# Patient Record
Sex: FEMALE | Race: WHITE
[De-identification: names, ages, dates, MRNs, and addresses within clinical notes are randomized per-mention and may not be internally consistent; named-entity substitution may affect disease eponyms.]

---

## 2017-02-15 ENCOUNTER — HOSPITAL ENCOUNTER (OUTPATIENT)
Dept: HOSPITAL 45 - C.RAD | Age: 52
Discharge: HOME | End: 2017-02-15
Attending: FAMILY MEDICINE
Payer: COMMERCIAL

## 2017-02-15 DIAGNOSIS — H69.80: Primary | ICD-10-CM

## 2017-02-15 DIAGNOSIS — F41.8: ICD-10-CM

## 2017-02-15 DIAGNOSIS — R13.10: ICD-10-CM

## 2017-02-15 DIAGNOSIS — K44.9: ICD-10-CM

## 2017-02-15 NOTE — DIAGNOSTIC IMAGING REPORT
DOUBLE CONTRAST BARIUM ESOPHAGRAM



CLINICAL HISTORY:  Swallowing difficulty.



COMPARISON STUDY:  Abdominal CT dated 1/27/2015.



TECHNIQUE: A standard air contrast barium esophagram is performed. Multiple spot

images of the esophagus are acquired both upright and prone.



FINDINGS: The patient swallowed barium without difficulty. The barium pill

became lodged in the distal esophagus before passing into the stomach. Mild

dysmotility is observed. The distal esophagus is slightly patulous. The mucosal

pattern is normal. There is no evidence of intrinsic or extrinsic mass lesion.

No aspiration was seen.  The gastroesophageal junction distended normally. No

gastroesophageal reflux could be elicited by having the patient perform the

Valsalva maneuver. A small hiatal hernia is identified.



Fluoroscopy time:  1.5 minutes.



Fluoroscopic images: 27





IMPRESSION:



1. Mild esophageal dysmotility. The barium pill became lodged in the distal

esophagus before passing in the stomach.



2. Small hiatal hernia.







Electronically signed by:  Elmer Moulton M.D.

2/15/2017 8:31 AM



Dictated Date/Time:  2/15/2017 8:28 AM

## 2017-04-24 ENCOUNTER — HOSPITAL ENCOUNTER (OUTPATIENT)
Dept: HOSPITAL 45 - C.RADBC | Age: 52
Discharge: HOME | End: 2017-04-24
Attending: ANESTHESIOLOGY
Payer: COMMERCIAL

## 2017-04-24 DIAGNOSIS — Z98.890: Primary | ICD-10-CM

## 2017-04-24 NOTE — DIAGNOSTIC IMAGING REPORT
CHEST 2 VIEWS ROUTINE



CLINICAL HISTORY: Status post intercostal nerve block    



COMPARISON STUDY:  No previous studies for comparison.



FINDINGS: The cardiac and mediastinal contours are normal. There is no evidence

of focal pulmonary consolidation. There is no evidence of failure. No pleural

effusions are visualized.[ There is no pneumothorax. Increased markings the

right medial lung base are felt to be secondary to either atelectasis or a fat

pad.



IMPRESSION: No active disease in the chest. No evidence of pneumothorax status

post intercostal nerve block.







Electronically signed by:  Chinedu Morris M.D.

4/24/2017 3:42 PM



Dictated Date/Time:  4/24/2017 3:41 PM

## 2017-06-08 ENCOUNTER — HOSPITAL ENCOUNTER (OUTPATIENT)
Dept: HOSPITAL 45 - C.RADBC | Age: 52
Discharge: HOME | End: 2017-06-08
Attending: ANESTHESIOLOGY
Payer: COMMERCIAL

## 2017-06-08 DIAGNOSIS — Z98.890: Primary | ICD-10-CM

## 2017-06-08 NOTE — DIAGNOSTIC IMAGING REPORT
TWO VIEW CHEST



CLINICAL HISTORY: Status post right intercostal injection.



FINDINGS: PA and lateral chest radiographs are compared to study dated

4/24/2017. The examination is degraded by large body habitus. The

cardiomediastinal silhouette is unremarkable.  There is mild bibasilar

atelectasis. The lungs and pleural spaces are otherwise clear. There is no

pneumothorax. The skeletal structures are osteopenic. The bony thorax appears

intact. Cholecystectomy clips are identified in the right upper quadrant.



IMPRESSION:



1. No pneumothorax is seen post procedure.



2. There is no airspace consolidation or pleural effusion.







Electronically signed by:  Elmer Moulton M.D.

6/8/2017 1:55 PM



Dictated Date/Time:  6/8/2017 1:54 PM

## 2017-08-10 ENCOUNTER — HOSPITAL ENCOUNTER (OUTPATIENT)
Dept: HOSPITAL 45 - C.RADBC | Age: 52
Discharge: HOME | End: 2017-08-10
Attending: ANESTHESIOLOGY
Payer: COMMERCIAL

## 2017-08-10 DIAGNOSIS — Z98.890: Primary | ICD-10-CM

## 2017-08-10 NOTE — DIAGNOSTIC IMAGING REPORT
CHEST 2 VIEWS ROUTINE



CLINICAL HISTORY: Status post intercostal nerve block. Evaluate for

pneumothorax.    



COMPARISON STUDY:  Chest radiograph June 8, 2017.



FINDINGS: Lung volumes are normal. There is no pneumothorax or pleural effusion.

A calcified left mid to lower lung nodule is incidentally noted.

Cardiomediastinal silhouette is normal. There is no consolidation. Pulmonary

vascularity is normal. The appearance of the chest is unchanged. 



IMPRESSION:  No acute cardiopulmonary findings. No pneumothorax. 









Electronically signed by:  Kevin Valle M.D.

8/10/2017 10:09 AM



Dictated Date/Time:  8/10/2017 10:06 AM

## 2017-09-15 ENCOUNTER — HOSPITAL ENCOUNTER (OUTPATIENT)
Dept: HOSPITAL 45 - C.CTS | Age: 52
Discharge: HOME | End: 2017-09-15
Attending: NURSE PRACTITIONER
Payer: COMMERCIAL

## 2017-09-15 DIAGNOSIS — R51: Primary | ICD-10-CM

## 2017-09-15 NOTE — DIAGNOSTIC IMAGING REPORT
HEAD COMBO



CLINICAL HISTORY: Severe headache. Blurred vision.    



COMPARISON STUDY:  MRI the brain May 21, 2013.



TECHNIQUE: Axial images of the head were obtained before and after intravenous

administration of 94 cc Optiray 320 IV.



FINDINGS: No acute intracranial hemorrhage, midline shift or mass effect is

present. Ventricular system is normal. Basilar cisterns are patent. There are no

extra-axial collections. Gray-white differentiation is maintained. There are no

findings to suggest acute dural sinus thrombosis or acute territorial infarct.

There is no intracranial mass or pathologic enhancement. There are no

significant calvarial abnormalities. Visualized portions of the sinuses and

mastoid air cells are clear.



IMPRESSION:  



1. No acute intracranial findings.



2. No intracranial mass or pathologic enhancement. 









Electronically signed by:  Kevin Valle M.D.

9/15/2017 11:20 AM



Dictated Date/Time:  9/15/2017 11:15 AM

## 2017-09-19 ENCOUNTER — HOSPITAL ENCOUNTER (EMERGENCY)
Dept: HOSPITAL 45 - C.EDB | Age: 52
Discharge: HOME | End: 2017-09-19
Payer: COMMERCIAL

## 2017-09-19 VITALS — DIASTOLIC BLOOD PRESSURE: 49 MMHG | HEART RATE: 82 BPM | OXYGEN SATURATION: 97 % | SYSTOLIC BLOOD PRESSURE: 101 MMHG

## 2017-09-19 VITALS — TEMPERATURE: 98.06 F

## 2017-09-19 VITALS
WEIGHT: 218.92 LBS | BODY MASS INDEX: 36.47 KG/M2 | HEIGHT: 65 IN | HEIGHT: 65 IN | WEIGHT: 218.92 LBS | BODY MASS INDEX: 36.47 KG/M2

## 2017-09-19 DIAGNOSIS — K21.9: ICD-10-CM

## 2017-09-19 DIAGNOSIS — R10.9: Primary | ICD-10-CM

## 2017-09-19 DIAGNOSIS — K43.9: ICD-10-CM

## 2017-09-19 DIAGNOSIS — F17.200: ICD-10-CM

## 2017-09-19 LAB
ALBUMIN/GLOB SERPL: 0.9 {RATIO} (ref 0.9–2)
ALP SERPL-CCNC: 116 U/L (ref 45–117)
ALT SERPL-CCNC: 18 U/L (ref 12–78)
ANION GAP SERPL CALC-SCNC: 6 MMOL/L (ref 3–11)
APPEARANCE UR: CLEAR
AST SERPL-CCNC: 13 U/L (ref 15–37)
BASOPHILS # BLD: 0.04 K/UL (ref 0–0.2)
BASOPHILS NFR BLD: 0.4 %
BILIRUB UR-MCNC: (no result) MG/DL
BUN SERPL-MCNC: 11 MG/DL (ref 7–18)
BUN/CREAT SERPL: 13.3 (ref 10–20)
CALCIUM SERPL-MCNC: 9.1 MG/DL (ref 8.5–10.1)
CHLORIDE SERPL-SCNC: 106 MMOL/L (ref 98–107)
CO2 SERPL-SCNC: 27 MMOL/L (ref 21–32)
COLOR UR: YELLOW
COMPLETE: YES
CREAT CL PREDICTED SERPL C-G-VRATE: 93.7 ML/MIN
CREAT SERPL-MCNC: 0.82 MG/DL (ref 0.6–1.2)
EOSINOPHIL NFR BLD AUTO: 265 K/UL (ref 130–400)
GLOBULIN SER-MCNC: 3.5 GM/DL (ref 2.5–4)
GLUCOSE SERPL-MCNC: 89 MG/DL (ref 70–99)
HCT VFR BLD CALC: 48.9 % (ref 37–47)
IG%: 0.6 %
IMM GRANULOCYTES NFR BLD AUTO: 27.9 %
INR PPP: 0.9 (ref 0.9–1.1)
LYMPHOCYTES # BLD: 2.76 K/UL (ref 1.2–3.4)
MANUAL MICROSCOPIC REQUIRED?: NO
MCH RBC QN AUTO: 31 PG (ref 25–34)
MCHC RBC AUTO-ENTMCNC: 33.5 G/DL (ref 32–36)
MCV RBC AUTO: 92.4 FL (ref 80–100)
MONOCYTES NFR BLD: 8.1 %
NEUTROPHILS # BLD AUTO: 1 %
NEUTROPHILS NFR BLD AUTO: 62 %
NITRITE UR QL STRIP: (no result)
PH UR STRIP: 6.5 [PH] (ref 4.5–7.5)
PMV BLD AUTO: 10.4 FL (ref 7.4–10.4)
POTASSIUM SERPL-SCNC: 4 MMOL/L (ref 3.5–5.1)
PROTHROMBIN TIME: 9.6 SECONDS (ref 9–12)
RBC # BLD AUTO: 5.29 M/UL (ref 4.2–5.4)
REVIEW REQ?: NO
SODIUM SERPL-SCNC: 139 MMOL/L (ref 136–145)
SP GR UR STRIP: 1.02 (ref 1–1.03)
URINE BILL WITH OR WITHOUT MIC: (no result)
URINE EPITHELIAL CELL AUTO: (no result) /LPF (ref 0–5)
UROBILINOGEN UR-MCNC: (no result) MG/DL
WBC # BLD AUTO: 9.88 K/UL (ref 4.8–10.8)
ZZUR CULT IF INDIC CLEAN CATCH: NO

## 2017-09-19 NOTE — DIAGNOSTIC IMAGING REPORT
ABD/PELVIS IV AND ORAL CONT



CT DOSE: 1335.74 mGy.cm



HISTORY: Pain  periumbilical pain, hernia



TECHNIQUE: Multiaxial CT images of the abdomen and pelvis were performed

following the use of intravenous and oral contrast.  A dose lowering technique

was utilized adhering to the principles of ALARA.





COMPARISON STUDY: 1/27/2015



FINDINGS: Lung bases are clear. Mild fatty infiltration of liver.

Cholecystectomy. Pancreas is uniform. Kidneys enhance uniformly. No evidence for

hydronephrosis.



Bowel pattern overall is nonobstructive. There is a fat-containing periumbilical

hernia. This is increased from 1.6 to 2.9 cm in maximal cross-sectional

dimension. There is no evidence of bowel containment or obstruction. Bladder is

midline. There is no significant free fluid within the cul-de-sac.



IMPRESSION: 



1. Fat-containing periumbilical hernia increased from 1.62 to 3cm in maximum

transaxial dimension.

2. No evidence of bowel containment or obstruction.





3. Mild fatty infiltration of liver.





4. Otherwise negative study post cholecystectomy 









The above report was generated using voice recognition software.  It may contain

grammatical, syntax or spelling errors.







Electronically signed by:  Gerry Mondragon M.D.

9/19/2017 7:05 PM



Dictated Date/Time:  9/19/2017 6:59 PM

## 2017-09-19 NOTE — EMERGENCY ROOM VISIT NOTE
History


Report prepared by Mona:  Alan Mosher


Under the Supervision of:  Dr. Klarissa Laughlin D.O.


First contact with patient:  16:02


Chief Complaint:  ABDOMINAL PAIN


Stated Complaint:  INJFECTED? HERNIA





History of Present Illness


The patient is a 52 year old female who presents to the Emergency Room with 

complaints of a constant, burning sensation to the periumbilical region of her 

abdomen beginning three days ago. The patient states that she has history of 

three hernias at her umbilicus and an infected gallstone. She reports that she 

had one surgery to remove them all. The patient notes that the gallstone and 

hernias occurred one year after her cholecystectomy. She states that she 

developed another hernia, in the same location, one month ago. The patient 

reports that three days ago she sneezed and immediately experienced a burning 

in the location of her hernia. She notes that movement worsens her symptoms, 

but it is tolerable when she is resting. The patient states that she called her 

PCP and was told to go the ED. She reports that the lump is getting bigger and 

it is currently warm. The patient notes that she has also been experiencing 

more problems with her GERD, nausea, and a subjected low grade fever. She 

denies chills. The patient states that she is currently on Augmentin for a 

sinus infection. She reports that she has not had a bowel movement in three 

days. The patient notes that a few days ago, she drank on cup of coffee and a 

can of mountain dew. She states that she went to the restroom eight times in 

four hours. The patient reports that this typically happens with water, nothing 

else. She notes that she has not taken medication for her pain because she does 

not like swallowing pills. The patient notes that she has a history of severe 

trauma from rolling down 14 stairs at work. She states that she is still 

recovering.





   Source of History:  patient


   Onset:  three days ago


   Position:  abdomen (periumbilical)


   Quality:  burning


   Timing:  constant


   Modifying Factors (Worsening):  movement


   Modifying Factors (Relieving):  rest


   Associated Symptoms:  + fevers, + nausea, No chills


Note:


Associated symptoms: constipation, GERD, increased urinary frequency





Review of Systems


See HPI for pertinent positives & negatives. A total of 10 systems reviewed and 

were otherwise negative.





Past Medical & Surgical


Medical Problems:


(1) GERD (gastroesophageal reflux disease)


(2) Hernia








Family History





Patient reports no known family medical history.





Social History


Smoking Status:  Current Every Day Smoker


Marital Status:  


Occupation Status:  unemployed





Current/Historical Medications


Scheduled


Amoxicillin & Pot Clavulanate (Augmentin 875-125 mg), 1 TAB PO BID


Citalopram Hydrobromide (Celexa), 1 TAB PO DAILY


Lamotrigine (Lamictal), 100 MG PO DAILY


Meloxicam (Mobic), 15 MG PO DAILY


Mometasone Furoate-Formoterol (Dulera 100/5 Mcg), 2 PUFFS INH BID


Omeprazole (Prilosec), 40 MG PO DAILY





Scheduled PRN


Albuterol Hfa (Ventolin Hfa), 2-4 PUFFS INH Q6H PRN for Shortness of Breath


Lorazepam (Ativan), 1.5 MG PO DAILY PRN for Anxiety


Tramadol (Ultram), 50 MG PO Q4H PRN for Pain





Allergies


Coded Allergies:  


     BEE STING (Unverified  Allergy, Severe, VOMITING/SWELLING, 9/19/17)


     Bacitracin (Verified  Allergy, Unknown, SEVERE RASH, 9/19/17)


     Bell Pepper (Unverified  Allergy, Unknown, GI ISSUES, 9/19/17)


     Hydrocodone (Verified  Allergy, Unknown, INSOMNIA, RESTLESSNESS, 9/19/17)


     Neomycin (Verified  Allergy, Unknown, SEVERE RASH, 9/19/17)


     Polymyxin B (Verified  Allergy, Unknown, SEVERE RASH, 9/19/17)


     Acetaminophen (Verified  Adverse Reaction, Unknown, VOMITING, 9/19/17)


     Doxycycline (Verified  Adverse Reaction, Unknown, VOMITING, 9/19/17)


     Oxycodone (Verified  Adverse Reaction, Unknown, VOMITING, 9/19/17)





Physical Exam


Vital Signs











  Date Time  Temp Pulse Resp B/P (MAP) Pulse Ox O2 Delivery O2 Flow Rate FiO2


 


9/19/17 20:46  82 16 101/49 97   


 


9/19/17 19:52  85 16 103/75 96 Room Air  


 


9/19/17 17:43  86 16 107/80 94 Room Air  


 


9/19/17 15:59 36.7 111 16 143/82 93 Room Air  











Physical Exam


GENERAL: alert, well appearing, well nourished, no distress, non-toxic, very 

obese


EYE EXAM: normal conjunctiva, PERRL and EOM's grossly intact


OROPHARYNX: no exudate, no erythema, lips, buccal mucosa, and tongue normal and 

mucous membranes are dry


NECK: supple, no nuchal rigidity, no adenopathy, non-tender


LUNGS: Clear to auscultation. Normal chest wall mechanics


HEART: no murmurs, S1 normal and S2 normal 


ABDOMEN: abdomen soft, normo-active bowel sounds, no masses, no rebound or 

guarding. Superior to the umbilicus - area of firmness with tenderness upon 

palpation. Nearly well-healed surgical scar, no obvious mass. Rest of abdomen 

was nontender, no overlying erythema.


BACK: Back is symmetrical on inspection and there is no deformity, no midline 

tenderness, no CVA tenderness. 


SKIN: no rashes and no bruising 


UPPER EXTREMITIES: upper extremities are grossly normal. 


LOWER EXTREMITIES: No pitting edema.


NEURO EXAM: Normal sensorium, cranial nerves II-XII grossly intact, normal 

speech,  no gross weakness of arms, no gross weakness of legs.





Medical Decision & Procedures


ER Provider


Diagnostic Interpretation:


CT:Per my review, radiologist interpretation.





ABD/PELVIS IV AND ORAL CONT





CT DOSE: 1335.74 mGy.cm





HISTORY: Pain  periumbilical pain, hernia





TECHNIQUE: Multiaxial CT images of the abdomen and pelvis were performed


following the use of intravenous and oral contrast.  A dose lowering technique


was utilized adhering to the principles of ALARA.








COMPARISON STUDY: 1/27/2015





FINDINGS: Lung bases are clear. Mild fatty infiltration of liver.


Cholecystectomy. Pancreas is uniform. Kidneys enhance uniformly. No evidence for


hydronephrosis.





Bowel pattern overall is nonobstructive. There is a fat-containing periumbilical


hernia. This is increased from 1.6 to 2.9 cm in maximal cross-sectional


dimension. There is no evidence of bowel containment or obstruction. Bladder is


midline. There is no significant free fluid within the cul-de-sac.





IMPRESSION: 


1. Fat-containing periumbilical hernia increased from 1.62 to 3cm in maximum


transaxial dimension.


2. No evidence of bowel containment or obstruction.


3. Mild fatty infiltration of liver.


4. Otherwise negative study post cholecystectomy 





The above report was generated using voice recognition software.  It may contain


grammatical, syntax or spelling errors.





Electronically signed by:  Gerry Mondragon M.D.


9/19/2017 7:05 PM





Dictated Date/Time:  9/19/2017 6:59 PM





Laboratory Results


9/19/17 16:30








Red Blood Count 5.29, Mean Corpuscular Volume 92.4, Mean Corpuscular Hemoglobin 

31.0, Mean Corpuscular Hemoglobin Concent 33.5, Mean Platelet Volume 10.4, 

Neutrophils (%) (Auto) 62.0, Lymphocytes (%) (Auto) 27.9, Monocytes (%) (Auto) 

8.1, Eosinophils (%) (Auto) 1.0, Basophils (%) (Auto) 0.4, Neutrophils # (Auto) 

6.12, Lymphocytes # (Auto) 2.76, Monocytes # (Auto) 0.80, Eosinophils # (Auto) 

0.10, Basophils # (Auto) 0.04





9/19/17 16:30

















Test


  9/19/17


16:30


 


White Blood Count


  9.88 K/uL


(4.8-10.8)


 


Red Blood Count


  5.29 M/uL


(4.2-5.4)


 


Hemoglobin


  16.4 g/dL


(12.0-16.0)


 


Hematocrit 48.9 % (37-47) 


 


Mean Corpuscular Volume


  92.4 fL


()


 


Mean Corpuscular Hemoglobin


  31.0 pg


(25-34)


 


Mean Corpuscular Hemoglobin


Concent 33.5 g/dl


(32-36)


 


Platelet Count


  265 K/uL


(130-400)


 


Mean Platelet Volume


  10.4 fL


(7.4-10.4)


 


Neutrophils (%) (Auto) 62.0 % 


 


Lymphocytes (%) (Auto) 27.9 % 


 


Monocytes (%) (Auto) 8.1 % 


 


Eosinophils (%) (Auto) 1.0 % 


 


Basophils (%) (Auto) 0.4 % 


 


Neutrophils # (Auto)


  6.12 K/uL


(1.4-6.5)


 


Lymphocytes # (Auto)


  2.76 K/uL


(1.2-3.4)


 


Monocytes # (Auto)


  0.80 K/uL


(0.11-0.59)


 


Eosinophils # (Auto)


  0.10 K/uL


(0-0.5)


 


Basophils # (Auto)


  0.04 K/uL


(0-0.2)


 


RDW Standard Deviation


  45.4 fL


(36.4-46.3)


 


RDW Coefficient of Variation


  13.5 %


(11.5-14.5)


 


Immature Granulocyte % (Auto) 0.6 % 


 


Immature Granulocyte # (Auto)


  0.06 K/uL


(0.00-0.02)


 


Prothrombin Time


  9.6 SECONDS


(9.0-12.0)


 


Prothromb Time International


Ratio 0.9 (0.9-1.1) 


 


 


Urine Color YELLOW 


 


Urine Appearance CLEAR (CLEAR) 


 


Urine pH 6.5 (4.5-7.5) 


 


Urine Specific Gravity


  1.023


(1.000-1.030)


 


Urine Protein NEG (NEG) 


 


Urine Glucose (UA) NEG (NEG) 


 


Urine Ketones TRACE (NEG) 


 


Urine Occult Blood NEG (NEG) 


 


Urine Nitrite NEG (NEG) 


 


Urine Bilirubin NEG (NEG) 


 


Urine Urobilinogen NEG (NEG) 


 


Urine Leukocyte Esterase TRACE (NEG) 


 


Urine WBC (Auto) 1-5 /hpf (0-5) 


 


Urine RBC (Auto) 0-4 /hpf (0-4) 


 


Urine Hyaline Casts (Auto) 1-5 /lpf (0-5) 


 


Urine Epithelial Cells (Auto)


  20-30 /lpf


(0-5)


 


Urine Bacteria (Auto) NEG (NEG) 


 


Anion Gap


  6.0 mmol/L


(3-11)


 


Est Creatinine Clear Calc


Drug Dose 93.7 ml/min 


 


 


Estimated GFR (


American) 95.4 


 


 


Estimated GFR (Non-


American 82.3 


 


 


BUN/Creatinine Ratio 13.3 (10-20) 


 


Lactic Acid Level


  1.2 mmol/L


(0.4-2.0)


 


Calcium Level


  9.1 mg/dl


(8.5-10.1)


 


Total Bilirubin


  0.2 mg/dl


(0.2-1)


 


Aspartate Amino Transf


(AST/SGOT) 13 U/L (15-37) 


 


 


Alanine Aminotransferase


(ALT/SGPT) 18 U/L (12-78) 


 


 


Alkaline Phosphatase


  116 U/L


()


 


Troponin I


  < 0.015 ng/ml


(0-0.045)


 


Total Protein


  6.7 gm/dl


(6.4-8.2)


 


Albumin


  3.2 gm/dl


(3.4-5.0)


 


Globulin


  3.5 gm/dl


(2.5-4.0)


 


Albumin/Globulin Ratio 0.9 (0.9-2) 





Laboratory results per my review.





Medications Administered











 Medications


  (Trade)  Dose


 Ordered  Sig/Michi


 Route  Start Time


 Stop Time Status Last Admin


Dose Admin


 


 Sodium Chloride  1,000 ml @ 


 250 mls/hr  Q4H STAT


 IV  9/19/17 16:19


 9/19/17 20:18 DC 9/19/17 16:38


250 MLS/HR


 


 Morphine Sulfate


  (MoRPHine


 SULFATE INJ)  4 mg  NOW  STAT


 IV  9/19/17 16:19


 9/19/17 16:22 DC 9/19/17 16:38


4 MG


 


 Ondansetron HCl


  (Zofran 8mg Iv)  8 mg  NOW  STAT


 IV  9/19/17 16:19


 9/19/17 16:22 DC 9/19/17 16:40


8 MG


 


 Tramadol HCl


  (Ultram Tab)  50 mg  NOW  STAT


 PO  9/19/17 19:48


 9/19/17 19:49 DC 9/19/17 19:52


50 MG











ECG


Indication:  abdominal pain


Rate (beats per minute):  98


Rhythm:  normal sinus


Findings:  no acute ischemic change, no ectopy, other (Normal axis and interval)





ED Course


1604: The patient was evaluated in room C02B. A complete history and physical 

exam was performed. 





1619: Ordered Ondansetron HCl 8mg IV, Morphine Sulfate 4mg IV, Sodium Chloride 

1000 ml @ 250 mls/hr IV





1943: I reevaluated the patient, she is resting. I updated her of her current 

exam findings and test results. She is still experiencing some discomfort. We 

are going to try Tramadol.





1948: Ordered Tramadol HCl 50mg PO





2034: Upon reevaluation, the patient is feeling better. I discussed the 

findings and the treatment plan with the patient.  She verbalizes agreement and 

understanding.  The patient was discharged home.





Medical Decision


Differential diagnoses includes but is not limited to gastritis, peptic ulcer 

disease, GERD, gallbladder disease, pancreatitis, small bowel obstruction, 

acute coronary syndrome, pericarditis, ischemic bowel, irritable bowel disease, 

irritable bowel syndrome, appendicitis, diverticulitis, malignancy, hernia, 

urinary tract infection, torsion, pregnancy/ectopic pregnancy, perforation, 

trauma, infectious. 





Pt with prior hx of hernia and repairs, fat containing hernia on CT noted, labs 

otw reassuring.  Discussed with pt all results, minimizing activity which could 

exacerbate pain.  Discussed f/u with surgeon, discussed sx to watch/return for, 

use of meds, she verbalized understanding and was agreeable with plan.  VS otw 

stable.  Doubt occult vascular or infectious pathology.





Impression





 Primary Impression:  


 Abdominal pain


 Additional Impression:  


 Hernia of abdominal wall





Scribe Attestation


The scribe's documentation has been prepared under my direction and personally 

reviewed by me in its entirety. I confirm that the note above accurately 

reflects all work, treatment, procedures, and medical decision making performed 

by me.





Departure Information


Dispostion


Home / Self-Care





Prescriptions





Tramadol (Ultram) 50 Mg Tab


50 MG PO Q4H Y for Pain, #10 TAB


   Prov: Klarissa Laughlin, DO         9/19/17





Referrals


Drew Moreau M.D. (PCP)





Forms


Call Back Authorization, HOME CARE DOCUMENTATION FORM,                         

                                       IMPORTANT VISIT INFORMATION





Patient Instructions


My [facility]





Additional Instructions





Please follow up with your regular doctor.  You may use the pain medications as 

prescribed.  If you have any worsening pain, develop fevers, vomiting, notice 

black or bloody stools, or you have any other new concerns, please return the 

emergency room.





Problem Qualifiers








 Primary Impression:  


 Abdominal pain


 Abdominal location:  unspecified location  Qualified Codes:  R10.9 - 

Unspecified abdominal pain

## 2018-01-03 ENCOUNTER — HOSPITAL ENCOUNTER (OUTPATIENT)
Dept: HOSPITAL 45 - C.RDSM | Age: 53
Discharge: HOME | End: 2018-01-03
Attending: PHYSICIAN ASSISTANT
Payer: COMMERCIAL
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
## 2018-01-03 DIAGNOSIS — S52.502D: Primary | ICD-10-CM

## 2018-01-03 NOTE — DIAGNOSTIC IMAGING REPORT
L WRIST W/NAVICULAR MIN 3 VIEWS



CLINICAL HISTORY: LEFT WRIST FRACTURE     



COMPARISON: Outside radiograph from [facility] [place] dated

12/10/2017



DISCUSSION: There is been interval application of a fiberglass cast. There is

subtle sclerosis near the level of the previously identified transverse distal

radial fracture. Fracture appears nondisplaced. The fine bony details obscured

by the cast.    



IMPRESSION: Casted nondisplaced distal radial fracture







Electronically signed by:  Chinedu Morris M.D.

1/3/2018 11:20 AM



Dictated Date/Time:  1/3/2018 11:18 AM

## 2018-01-19 ENCOUNTER — HOSPITAL ENCOUNTER (OUTPATIENT)
Dept: HOSPITAL 45 - C.RDSM | Age: 53
Discharge: HOME | End: 2018-01-19
Attending: PHYSICIAN ASSISTANT
Payer: COMMERCIAL

## 2018-01-19 DIAGNOSIS — M25.539: Primary | ICD-10-CM

## 2018-01-19 NOTE — DIAGNOSTIC IMAGING REPORT
LEFT WRIST 5 VIEWS



HISTORY:      Left wrist fracture. Follow-up.



COMPARISON: Left wrist 1/3/2018.



FINDINGS: The overlying cast has been removed. The distal radius fracture

appears to be completely healed. No acute fracture or dislocation within the

left wrist. Patchy areas of demineralization likely represent disuse osteopenia.

Soft tissues are unremarkable. No radiopaque foreign bodies.



IMPRESSION:  

Complete healing of the distal radius fracture.







Electronically signed by:  Pravin Clemente M.D.

1/19/2018 2:50 PM



Dictated Date/Time:  1/19/2018 2:48 PM

## 2018-02-12 ENCOUNTER — HOSPITAL ENCOUNTER (OUTPATIENT)
Dept: HOSPITAL 45 - C.RADBC | Age: 53
Discharge: HOME | End: 2018-02-12
Attending: ANESTHESIOLOGY
Payer: COMMERCIAL

## 2018-02-12 DIAGNOSIS — Z97.8: Primary | ICD-10-CM

## 2018-02-12 NOTE — DIAGNOSTIC IMAGING REPORT
CHEST 2 VIEWS ROUTINE



CLINICAL HISTORY: 52 years-old Female presenting with NERVE BLOCK. 



TECHNIQUE: PA and lateral views of the chest were obtained.



COMPARISON: 11/2/2017.



FINDINGS:

Cardiomediastinal silhouette normal. Lungs and pleural spaces clear. Osseous

structures normal. Upper abdomen normal.



IMPRESSION:

1.  No acute cardiopulmonary disease.







Electronically signed by:  Javier Paul M.D.

2/12/2018 3:01 PM



Dictated Date/Time:  2/12/2018 3:00 PM

## 2018-02-16 NOTE — DIAGNOSTIC IMAGING REPORT
L WRIST MIN 3 VIEWS ROUTINE



CLINICAL HISTORY: LEFT WRIST FRACTURE trauma. Pain.



COMPARISON: 1/19/2018



DISCUSSION: Signal and shows complete healing fractures distal radius. Bony

alignment is anatomic. All remaining osseous structures are unremarkable. There

is no evidence for soft tissue swelling.



IMPRESSION: Anatomic alignment status post complete healing of the distal radial

fracture.











The above report was generated using voice recognition software.  It may contain

grammatical, syntax or spelling errors.







Electronically signed by:  Gerry Mondragon M.D.

2/16/2018 1:35 PM



Dictated Date/Time:  2/16/2018 1:34 PM

## 2018-02-17 ENCOUNTER — HOSPITAL ENCOUNTER (OUTPATIENT)
Dept: HOSPITAL 45 - C.RDSM | Age: 53
Discharge: HOME | End: 2018-02-17
Attending: PHYSICIAN ASSISTANT
Payer: COMMERCIAL

## 2018-02-17 DIAGNOSIS — Z88.5: ICD-10-CM

## 2018-02-17 DIAGNOSIS — Z87.81: Primary | ICD-10-CM

## 2018-02-17 DIAGNOSIS — Z91.030: ICD-10-CM

## 2018-02-17 DIAGNOSIS — Z91.048: ICD-10-CM

## 2018-02-17 DIAGNOSIS — Z88.1: ICD-10-CM

## 2018-03-15 ENCOUNTER — HOSPITAL ENCOUNTER (OUTPATIENT)
Dept: HOSPITAL 45 - C.LABMFLN | Age: 53
Discharge: HOME | End: 2018-03-15
Attending: FAMILY MEDICINE
Payer: COMMERCIAL

## 2018-03-15 DIAGNOSIS — M25.50: Primary | ICD-10-CM

## 2018-03-15 DIAGNOSIS — R29.6: ICD-10-CM

## 2018-03-15 LAB
BUN SERPL-MCNC: 12 MG/DL (ref 7–18)
CALCIUM SERPL-MCNC: 8.9 MG/DL (ref 8.5–10.1)
CO2 SERPL-SCNC: 29 MMOL/L (ref 21–32)
CREAT SERPL-MCNC: 0.99 MG/DL (ref 0.6–1.2)
GLUCOSE SERPL-MCNC: 109 MG/DL (ref 70–99)
POTASSIUM SERPL-SCNC: 3.9 MMOL/L (ref 3.5–5.1)
SODIUM SERPL-SCNC: 139 MMOL/L (ref 136–145)

## 2018-03-28 ENCOUNTER — HOSPITAL ENCOUNTER (OUTPATIENT)
Dept: HOSPITAL 45 - C.RADBC | Age: 53
Discharge: HOME | End: 2018-03-28
Attending: ANESTHESIOLOGY
Payer: COMMERCIAL

## 2018-03-28 DIAGNOSIS — Z98.890: Primary | ICD-10-CM

## 2018-03-28 NOTE — DIAGNOSTIC IMAGING REPORT
CHEST 2 VIEWS ROUTINE



CLINICAL HISTORY: 52 years-old Female presenting with R/O PNEUMOTHERAPY, nerve

block. 



TECHNIQUE: PA and lateral views of the chest were obtained.



COMPARISON: 2/12/2018.



FINDINGS:

Cardiomediastinal silhouette normal. Linear opacities in the right mid lung

unchanged. Lungs and pleural spaces clear. Osseous structures normal. Upper

abdomen normal.



IMPRESSION:

1.  No acute cardiopulmonary disease. No pneumothorax.



2.  Right lung scarring unchanged.







Electronically signed by:  Javier Paul M.D.

3/28/2018 1:45 PM



Dictated Date/Time:  3/28/2018 1:43 PM

## 2018-04-02 ENCOUNTER — HOSPITAL ENCOUNTER (OUTPATIENT)
Dept: HOSPITAL 45 - C.RADBC | Age: 53
Discharge: HOME | End: 2018-04-02
Attending: ANESTHESIOLOGY
Payer: COMMERCIAL

## 2018-04-02 DIAGNOSIS — Z98.890: Primary | ICD-10-CM

## 2018-04-02 NOTE — DIAGNOSTIC IMAGING REPORT
TWO VIEW CHEST



CLINICAL HISTORY: Postprocedural examination. Assess for pneumothorax.



FINDINGS: PA and lateral chest radiographs are compared to study dated

3/28/2018. The cardiomediastinal silhouette is unremarkable.  Nonspecific

interstitial thickening is similar to previous. Scattered calcified granulomas

are observed. No airspace consolidation or pleural effusion is identified. There

is no pneumothorax. The bony thorax appears intact.



IMPRESSION:



1. No pneumothorax is seen post procedure.



2. No airspace consolidation or pleural effusion is identified.







Electronically signed by:  Elmer Moulton M.D.

4/2/2018 2:14 PM



Dictated Date/Time:  4/2/2018 2:13 PM

## 2018-04-17 ENCOUNTER — HOSPITAL ENCOUNTER (OUTPATIENT)
Dept: HOSPITAL 45 - C.RDSM | Age: 53
Discharge: HOME | End: 2018-04-17
Attending: PHYSICIAN ASSISTANT
Payer: COMMERCIAL
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
## 2018-04-17 DIAGNOSIS — S52.572D: Primary | ICD-10-CM

## 2018-04-17 DIAGNOSIS — S62.025D: ICD-10-CM

## 2018-04-17 NOTE — DIAGNOSTIC IMAGING REPORT
L WRIST W/NAVICULAR MIN 3 VIEWS



CLINICAL HISTORY: 52 years-old Female presenting with LEFT SCAPHOID AND DISTAL

RADIUS FRACTURE. 



TECHNIQUE: Frontal, bilateral oblique, lateral, and scaphoid views of the left

wrist were obtained. 



COMPARISON: 1/19/2018.



FINDINGS:

Osteopenia suspected. No deformity of the distal radius is visible. No acute

fracture or malalignment. No advanced degenerative change. No radiographic soft

tissue abnormality.



IMPRESSION:

1.  No deformity of the distal radius is visible.



2.  No acute osseous injury.



3.  Suspected osteopenia.







Electronically signed by:  Javier Paul M.D.

4/17/2018 1:01 PM



Dictated Date/Time:  4/17/2018 12:59 PM

## 2018-05-08 ENCOUNTER — HOSPITAL ENCOUNTER (OUTPATIENT)
Dept: HOSPITAL 45 - C.RC | Age: 53
Discharge: HOME | End: 2018-05-08
Attending: FAMILY MEDICINE
Payer: COMMERCIAL

## 2018-05-08 DIAGNOSIS — J44.9: Primary | ICD-10-CM

## 2018-05-08 DIAGNOSIS — J45.909: ICD-10-CM

## 2018-07-24 ENCOUNTER — HOSPITAL ENCOUNTER (EMERGENCY)
Dept: HOSPITAL 45 - C.EDB | Age: 53
Discharge: HOME | End: 2018-07-24
Payer: COMMERCIAL

## 2018-07-24 VITALS
BODY MASS INDEX: 35.82 KG/M2 | WEIGHT: 222.89 LBS | HEIGHT: 65.98 IN | WEIGHT: 222.89 LBS | BODY MASS INDEX: 35.82 KG/M2 | HEIGHT: 65.98 IN

## 2018-07-24 VITALS — OXYGEN SATURATION: 91 % | HEART RATE: 95 BPM | SYSTOLIC BLOOD PRESSURE: 122 MMHG | DIASTOLIC BLOOD PRESSURE: 98 MMHG

## 2018-07-24 VITALS — TEMPERATURE: 98.78 F

## 2018-07-24 DIAGNOSIS — Z98.890: ICD-10-CM

## 2018-07-24 DIAGNOSIS — K21.9: ICD-10-CM

## 2018-07-24 DIAGNOSIS — N39.0: Primary | ICD-10-CM

## 2018-07-24 DIAGNOSIS — F17.200: ICD-10-CM

## 2018-07-24 DIAGNOSIS — B37.0: ICD-10-CM

## 2018-07-24 DIAGNOSIS — Z79.899: ICD-10-CM

## 2018-07-24 DIAGNOSIS — K56.7: ICD-10-CM

## 2018-07-24 LAB
ALBUMIN SERPL-MCNC: 3 GM/DL (ref 3.4–5)
ALP SERPL-CCNC: 139 U/L (ref 45–117)
ALT SERPL-CCNC: 52 U/L (ref 12–78)
AST SERPL-CCNC: 52 U/L (ref 15–37)
BASOPHILS # BLD: 0.03 K/UL (ref 0–0.2)
BASOPHILS NFR BLD: 0.3 %
BUN SERPL-MCNC: 11 MG/DL (ref 7–18)
CALCIUM SERPL-MCNC: 9.1 MG/DL (ref 8.5–10.1)
CO2 SERPL-SCNC: 28 MMOL/L (ref 21–32)
CREAT SERPL-MCNC: 1 MG/DL (ref 0.6–1.2)
EOS ABS #: 0.3 K/UL (ref 0–0.5)
EOSINOPHIL NFR BLD AUTO: 291 K/UL (ref 130–400)
GLUCOSE SERPL-MCNC: 90 MG/DL (ref 70–99)
HCT VFR BLD CALC: 44.7 % (ref 37–47)
HGB BLD-MCNC: 15.3 G/DL (ref 12–16)
IG#: 0.06 K/UL (ref 0–0.02)
IMM GRANULOCYTES NFR BLD AUTO: 25 %
LYMPHOCYTES # BLD: 2.3 K/UL (ref 1.2–3.4)
MCH RBC QN AUTO: 30.8 PG (ref 25–34)
MCHC RBC AUTO-ENTMCNC: 34.2 G/DL (ref 32–36)
MCV RBC AUTO: 90.1 FL (ref 80–100)
MONO ABS #: 0.74 K/UL (ref 0.11–0.59)
MONOCYTES NFR BLD: 8 %
NEUT ABS #: 5.78 K/UL (ref 1.4–6.5)
NEUTROPHILS # BLD AUTO: 3.3 %
NEUTROPHILS NFR BLD AUTO: 62.7 %
PMV BLD AUTO: 10.7 FL (ref 7.4–10.4)
POTASSIUM SERPL-SCNC: 3.6 MMOL/L (ref 3.5–5.1)
PROT SERPL-MCNC: 7.8 GM/DL (ref 6.4–8.2)
RED CELL DISTRIBUTION WIDTH CV: 12.9 % (ref 11.5–14.5)
RED CELL DISTRIBUTION WIDTH SD: 42.2 FL (ref 36.4–46.3)
SODIUM SERPL-SCNC: 138 MMOL/L (ref 136–145)
WBC # BLD AUTO: 9.21 K/UL (ref 4.8–10.8)

## 2018-07-24 NOTE — EMERGENCY ROOM VISIT NOTE
History


First contact with patient:  15:34


Chief Complaint:  OTHER COMPLAINT


Stated Complaint:  POST OP PAIN,MOUTH IRRITATION,BLOOD IN URINE





History of Present Illness


The patient is a 53 year old female who presents to the Emergency Room with 

multiple complaints.  The patient states that she had "6 hernias repaired" 

approximately 1 week ago.  She reports that she had mesh inserted as well.  The 

surgery was done by Dr. Garcias.  She states that while in the hospital, her 

urine was a reddish orange color and this has continued since discharge.  She 

has noticed that her urine is more red in color in the morning.  She states 

that her incisions have been bothering her.  She also complains of a dull, 

aching and burning sensation in her left lower back/flank.  She is taking 

Ultram for her pain with some relief.  She rates her overall discomfort an 8/

10.  She also reports that her mouth has been irritated and this happens every 

time she receives anesthesia.  She has had a decreased appetite.  She has been 

having bowel movements every day.  She denies any nausea/vomiting, fevers, 

chest pain or shortness of breath.





Review of Systems


A complete 10 point review of systems was reviewed with the patient with 

pertinent positives and negatives as per history of present illness. All else 

were negative.





Past Medical/Surgical History


Medical Problems:


(1) GERD (gastroesophageal reflux disease)


(2) Hernia








Family History





Patient reports no known family medical history.





Social History


Smoking Status:  Current Every Day Smoker


Marital Status:  


Occupation Status:  unemployed





Current/Historical Medications


Scheduled


Cephalexin (Keflex), 1 CAP PO TID


Cetirizine (Zyrtec), 10 MG PO QAM


Citalopram Hydrobromide (Celexa), 1 TAB PO QAM


Lamotrigine (Lamictal), 100 MG PO QAM


Meloxicam (Mobic), 15 MG PO QAM


Mometasone Furoate-Formoterol (Dulera 100/5 Mcg), 2 PUFFS INH BID


Omeprazole (Prilosec), 40 MG PO QAM


Ranitidine Hcl (Zantac), 1 TAB PO QPM





Scheduled PRN


Albuterol Hfa (Ventolin Hfa), 2-4 PUFFS INH Q6H PRN for Shortness of Breath


Albuterol Sulf (Proventil 0.083% 2.5MG/3ML), 3 ML INH Q4H PRN for SOB/Wheezing


Lorazepam (Ativan), 1.5 MG PO DAILY PRN for Anxiety





Physical Exam


Vital Signs











  Date Time  Temp Pulse Resp B/P (MAP) Pulse Ox O2 Delivery O2 Flow Rate FiO2


 


7/24/18 18:30  95 20 122/98 91   


 


7/24/18 17:58  95 20 115/91 90 Room Air  


 


7/24/18 15:30 37.1 114 20 118/77 90 Room Air  











Physical Exam


VITALS: Vitals are noted on the nurse's note and reviewed by myself.  Vital 

signs stable.


GENERAL: This is a 53-year-old female, in no acute distress, nondiaphoretic, 

well-developed well-nourished.


SKIN: There are several well-healing surgical incisions to the abdomen with no 

evidence of inflammation or infection.


MOUTH: Mucous membranes moist.  There are white patches to the cheeks 

consistent with oral candidiasis.


HEART: Regular rate and rhythm without murmurs gallops or rubs.


LUNGS: Clear to auscultation bilaterally without wheezes, rales or rhonchi.  


ABDOMEN: There are well-healing surgical incisions the abdomen consistent with 

recent laparoscopic surgery.  Positive bowel sounds x 4.  Abdomen is soft and 

nondistended.  There is mild generalized tenderness to palpation with no focal 

tenderness.


NEURO: Patient was alert and oriented to person place and time.





Medical Decision & Procedures


ER Provider


Diagnostic Interpretation:


ABDOMEN 2VIEW W/PA CHEST RTN





HISTORY:  53 years-old Female abdominal pain, recent surgery acute generalized


abdominal pain with recent surgery





COMPARISON: Chest radiograph 6/26/2018, CT abdomen and pelvis 7/11/2018





TECHNIQUE: PA view of the chest with erect and supine views of the abdomen





FINDINGS: 


Cardiomediastinal and hilar silhouettes are within normal limits. There is no


pneumothorax, pleural effusion, focal airspace consolidation or overt pulmonary


edema. Minimal linear subsegmental atelectasis/scarring about the lingula. Bones


of the chest appear grossly intact. There are degenerative changes of the


shoulders and spine. Mild convex right curvature about the midthoracic spine.





Cholecystectomy clips are noted. Bowel gas pattern is nonobstructive. No


pneumatosis or pneumoperitoneum. Phleboliths of the pelvis. No urolith. Mild


gaseous distention is seen within loops of both large and small bowel small


bowel loops measuring up to 3.0 cm within the central abdomen. No fracture. Mild


degenerative changes about the lower lumbar spine and hips.





IMPRESSION: 


1. No acute process of the chest.


2. Nonobstructive bowel gas pattern without pneumoperitoneum.


3. Mild gaseous distention involving loops of both large and small bowel


suggests ileus.





Laboratory Results


7/24/18 15:52








Red Blood Count 4.96, Mean Corpuscular Volume 90.1, Mean Corpuscular Hemoglobin 

30.8, Mean Corpuscular Hemoglobin Concent 34.2, Mean Platelet Volume 10.7, 

Neutrophils (%) (Auto) 62.7, Lymphocytes (%) (Auto) 25.0, Monocytes (%) (Auto) 

8.0, Eosinophils (%) (Auto) 3.3, Basophils (%) (Auto) 0.3, Neutrophils # (Auto) 

5.78, Lymphocytes # (Auto) 2.30, Monocytes # (Auto) 0.74, Eosinophils # (Auto) 

0.30, Basophils # (Auto) 0.03





7/24/18 15:52

















Test


  7/24/18


15:52


 


White Blood Count


  9.21 K/uL


(4.8-10.8)


 


Red Blood Count


  4.96 M/uL


(4.2-5.4)


 


Hemoglobin


  15.3 g/dL


(12.0-16.0)


 


Hematocrit 44.7 % (37-47) 


 


Mean Corpuscular Volume


  90.1 fL


()


 


Mean Corpuscular Hemoglobin


  30.8 pg


(25-34)


 


Mean Corpuscular Hemoglobin


Concent 34.2 g/dl


(32-36)


 


Platelet Count


  291 K/uL


(130-400)


 


Mean Platelet Volume


  10.7 fL


(7.4-10.4)


 


Neutrophils (%) (Auto) 62.7 % 


 


Lymphocytes (%) (Auto) 25.0 % 


 


Monocytes (%) (Auto) 8.0 % 


 


Eosinophils (%) (Auto) 3.3 % 


 


Basophils (%) (Auto) 0.3 % 


 


Neutrophils # (Auto)


  5.78 K/uL


(1.4-6.5)


 


Lymphocytes # (Auto)


  2.30 K/uL


(1.2-3.4)


 


Monocytes # (Auto)


  0.74 K/uL


(0.11-0.59)


 


Eosinophils # (Auto)


  0.30 K/uL


(0-0.5)


 


Basophils # (Auto)


  0.03 K/uL


(0-0.2)


 


RDW Standard Deviation


  42.2 fL


(36.4-46.3)


 


RDW Coefficient of Variation


  12.9 %


(11.5-14.5)


 


Immature Granulocyte % (Auto) 0.7 % 


 


Immature Granulocyte # (Auto)


  0.06 K/uL


(0.00-0.02)


 


Urine Color ORANGE 


 


Urine Appearance CLEAR (CLEAR) 


 


Urine pH 5.5 (4.5-7.5) 


 


Urine Specific Gravity


  1.024


(1.000-1.030)


 


Urine Protein NEG (NEG) 


 


Urine Glucose (UA) NEG (NEG) 


 


Urine Ketones TRACE (NEG) 


 


Urine Occult Blood NEG (NEG) 


 


Urine Nitrite POS (NEG) 


 


Urine Bilirubin NEG (NEG) 


 


Urine Urobilinogen POS (NEG) 


 


Urine Leukocyte Esterase MODERATE (NEG) 


 


Urine WBC (Auto) 1-5 /hpf (0-5) 


 


Urine RBC (Auto) 0-4 /hpf (0-4) 


 


Urine Hyaline Casts (Auto) 1-5 /lpf (0-5) 


 


Urine Epithelial Cells (Auto) >30 /lpf (0-5) 


 


Urine Bacteria (Auto) NEG (NEG) 


 


Anion Gap


  8.0 mmol/L


(3-11)


 


Est Creatinine Clear Calc


Drug Dose 78.1 ml/min 


 


 


Estimated GFR (


American) 74.5 


 


 


Estimated GFR (Non-


American 64.3 


 


 


BUN/Creatinine Ratio 11.1 (10-20) 


 


Calcium Level


  9.1 mg/dl


(8.5-10.1)


 


Total Bilirubin


  0.9 mg/dl


(0.2-1)


 


Aspartate Amino Transf


(AST/SGOT) 52 U/L (15-37) 


 


 


Alanine Aminotransferase


(ALT/SGPT) 52 U/L (12-78) 


 


 


Alkaline Phosphatase


  139 U/L


()


 


Total Protein


  7.8 gm/dl


(6.4-8.2)


 


Albumin


  3.0 gm/dl


(3.4-5.0)


 


Globulin


  4.8 gm/dl


(2.5-4.0)


 


Albumin/Globulin Ratio 0.6 (0.9-2) 











Medications Administered











 Medications


  (Trade)  Dose


 Ordered  Sig/Michi


 Route  Start Time


 Stop Time Status Last Admin


Dose Admin


 


 Sodium Chloride  1,000 ml @ 


 999 mls/hr  Q1H1M STAT


 IV  7/24/18 16:18


 7/24/18 17:18 DC 7/24/18 16:30


999 MLS/HR











Medical Decision


Differential diagnosis includes UTI, pyelonephritis, postoperative pain, 

postoperative infection, ileus, small bowel obstruction, among others.





The patient is a 53-year-old female who presents today complaining of 

discomfort after incisional hernia repair.  Labs revealed no leukocytosis, 

anemia or concerning electrolyte abnormalities.  Urinalysis was suggestive of 

infection, with presence of nitrites and leukocyte esterase.  Given this and 

patient's symptoms, she will be treated with hepatic for possible UTI pending 

culture.  Abdominal series was performed and shows no obstructive findings, but 

does show findings suggestive of ileus.  Patient was advised to keep a clear 

liquid diet for the next few days and return here if she has any vomiting or 

worsening abdominal pain.  She will follow-up with her surgeon as scheduled.  

She does have findings suggestive of oral candidiasis and reports she has had 

this previously when receiving anesthesia.  She will be placed on nystatin 

which was called into the pharmacy by the ED pharmacist.





The patient's case was reviewed with Dr. Dominique, ED attending physician, who 

agreed with my assessment and treatment plan.





Based on the patient's presentation and work up, I feel the patient is stable 

for outpatient treatment.  The patient was educated to return to the emergency 

department for any worsening of their current condition or new/concerning 

symptoms.  She will follow up with her surgeon.





Medication Reconcilliation


Current Medication List:  was personally reviewed by me





Blood Pressure Screening


Patient's blood pressure:  Normal blood pressure





Impression





 Primary Impression:  


 UTI (urinary tract infection)


 Additional Impressions:  


 Ileus


 Oral candidiasis





Departure Information


Dispostion


Home / Self-Care





Condition


GOOD





Prescriptions





Cephalexin (KEFLEX) 500 Mg Cap


1 CAP PO TID for 7 Days, #21 CAP


   Prov: Mary Ruth ., CODEY         7/24/18





Referrals


Drew Moreau M.D. (PCP)








John Garcias, DO





Patient Instructions


My [facility]





Additional Instructions





Keflex as prescribed.  This is for a possible UTI.





Nystatin as prescribed.  This is for your oral thrush.





Keep a clear liquid diet for the next 24-48 hours.  Make sure to stay well 

hydrated.





Follow-up with your surgeon as scheduled or sooner if needed.





Return to the emergency department for any worsening abdominal pain, vomiting, 

fevers or any other worsening or new/concerning symptoms.





Problem Qualifiers








 Primary Impression:  


 UTI (urinary tract infection)

## 2018-07-24 NOTE — DIAGNOSTIC IMAGING REPORT
ABDOMEN 2VIEW W/PA CHEST RTN



HISTORY:  53 years-old Female abdominal pain, recent surgery acute generalized

abdominal pain with recent surgery



COMPARISON: Chest radiograph 6/26/2018, CT abdomen and pelvis 7/11/2018



TECHNIQUE: PA view of the chest with erect and supine views of the abdomen



FINDINGS: 

Cardiomediastinal and hilar silhouettes are within normal limits. There is no

pneumothorax, pleural effusion, focal airspace consolidation or overt pulmonary

edema. Minimal linear subsegmental atelectasis/scarring about the lingula. Bones

of the chest appear grossly intact. There are degenerative changes of the

shoulders and spine. Mild convex right curvature about the midthoracic spine.



Cholecystectomy clips are noted. Bowel gas pattern is nonobstructive. No

pneumatosis or pneumoperitoneum. Phleboliths of the pelvis. No urolith. Mild

gaseous distention is seen within loops of both large and small bowel small

bowel loops measuring up to 3.0 cm within the central abdomen. No fracture. Mild

degenerative changes about the lower lumbar spine and hips.



IMPRESSION: 

1. No acute process of the chest.

2. Nonobstructive bowel gas pattern without pneumoperitoneum.

3. Mild gaseous distention involving loops of both large and small bowel

suggests ileus.







The above report was generated using voice recognition software. It may contain

grammatical, syntax or spelling errors.







Electronically signed by:  Dontae Lara M.D.

7/24/2018 5:04 PM



Dictated Date/Time:  7/24/2018 5:01 PM

## 2021-11-19 NOTE — PULMONARY FUNCTION TEST
READING BASED OF ATS STANDARDS:

 

Mild obstructive ventilatory disease which corrects post-bronchodilator.

 

BRONCHODILATOR RESPONSE:  Not significant response.

 

INTERPRETATION:  Mild obstructive ventilatory disease.
numerical 0-10